# Patient Record
Sex: MALE | Race: WHITE | NOT HISPANIC OR LATINO | Employment: FULL TIME | URBAN - METROPOLITAN AREA
[De-identification: names, ages, dates, MRNs, and addresses within clinical notes are randomized per-mention and may not be internally consistent; named-entity substitution may affect disease eponyms.]

---

## 2019-07-24 ENCOUNTER — HOSPITAL ENCOUNTER (EMERGENCY)
Facility: HOSPITAL | Age: 64
Discharge: HOME | End: 2019-07-24
Attending: EMERGENCY MEDICINE
Payer: COMMERCIAL

## 2019-07-24 VITALS
TEMPERATURE: 99 F | BODY MASS INDEX: 23.05 KG/M2 | DIASTOLIC BLOOD PRESSURE: 79 MMHG | HEART RATE: 65 BPM | OXYGEN SATURATION: 96 % | SYSTOLIC BLOOD PRESSURE: 126 MMHG | RESPIRATION RATE: 16 BRPM | HEIGHT: 64 IN | WEIGHT: 135 LBS

## 2019-07-24 DIAGNOSIS — S41.119A LACERATION OF ARM: ICD-10-CM

## 2019-07-24 PROCEDURE — 12002 RPR S/N/AX/GEN/TRNK2.6-7.5CM: CPT

## 2019-07-24 PROCEDURE — 90471 IMMUNIZATION ADMIN: CPT | Performed by: NURSE PRACTITIONER

## 2019-07-24 PROCEDURE — 25000003 PHARM REV CODE 250: Performed by: NURSE PRACTITIONER

## 2019-07-24 PROCEDURE — 90715 TDAP VACCINE 7 YRS/> IM: CPT | Performed by: NURSE PRACTITIONER

## 2019-07-24 PROCEDURE — 99284 EMERGENCY DEPT VISIT MOD MDM: CPT | Mod: 25

## 2019-07-24 PROCEDURE — 63600175 PHARM REV CODE 636 W HCPCS: Performed by: NURSE PRACTITIONER

## 2019-07-24 RX ORDER — HYDROCODONE BITARTRATE AND ACETAMINOPHEN 5; 325 MG/1; MG/1
1 TABLET ORAL
Status: COMPLETED | OUTPATIENT
Start: 2019-07-24 | End: 2019-07-24

## 2019-07-24 RX ORDER — LIDOCAINE HYDROCHLORIDE 10 MG/ML
10 INJECTION INFILTRATION; PERINEURAL
Status: COMPLETED | OUTPATIENT
Start: 2019-07-24 | End: 2019-07-24

## 2019-07-24 RX ORDER — MUPIROCIN 20 MG/G
OINTMENT TOPICAL 3 TIMES DAILY
Qty: 15 G | Refills: 0 | Status: SHIPPED | OUTPATIENT
Start: 2019-07-24 | End: 2019-07-31

## 2019-07-24 RX ORDER — MUPIROCIN 20 MG/G
1 OINTMENT TOPICAL
Status: COMPLETED | OUTPATIENT
Start: 2019-07-24 | End: 2019-07-24

## 2019-07-24 RX ORDER — CEPHALEXIN 500 MG/1
500 CAPSULE ORAL 2 TIMES DAILY
Qty: 10 CAPSULE | Refills: 0 | Status: SHIPPED | OUTPATIENT
Start: 2019-07-24 | End: 2019-07-29

## 2019-07-24 RX ADMIN — CLOSTRIDIUM TETANI TOXOID ANTIGEN (FORMALDEHYDE INACTIVATED), CORYNEBACTERIUM DIPHTHERIAE TOXOID ANTIGEN (FORMALDEHYDE INACTIVATED), BORDETELLA PERTUSSIS TOXOID ANTIGEN (GLUTARALDEHYDE INACTIVATED), BORDETELLA PERTUSSIS FILAMENTOUS HEMAGGLUTININ ANTIGEN (FORMALDEHYDE INACTIVATED), BORDETELLA PERTUSSIS PERTACTIN ANTIGEN, AND BORDETELLA PERTUSSIS FIMBRIAE 2/3 ANTIGEN 0.5 ML: 5; 2; 2.5; 5; 3; 5 INJECTION, SUSPENSION INTRAMUSCULAR at 12:07

## 2019-07-24 RX ADMIN — LIDOCAINE HYDROCHLORIDE 10 ML: 10 INJECTION, SOLUTION INFILTRATION; PERINEURAL at 12:07

## 2019-07-24 RX ADMIN — HYDROCODONE BITARTRATE AND ACETAMINOPHEN 1 TABLET: 5; 325 TABLET ORAL at 01:07

## 2019-07-24 RX ADMIN — MUPIROCIN 22 G: 20 OINTMENT TOPICAL at 02:07

## 2019-07-24 NOTE — DISCHARGE INSTRUCTIONS
Please keep your wound clean and dry.  Wash gently with soap and water and apply antibiotic ointment (bacitracin, neosporin, etc.) over the wound after washing. Please watch for signs of infection including: increased\spreading redness, swelling, pus-like discharge, or a fever greater than 100.4F. If you experience any of these, please contact your Primary Care Doctor or Return to the Emergency Department for a wound check.     Please follow up with your Primary Care Doctor in 10 days for wound recheck and suture removal.  You may return to the Emergency Department if you are unable to see your Primary Care Doctor.  Please return to the ER for any new or worsening symptoms.

## 2019-07-24 NOTE — ED PROVIDER NOTES
Encounter Date: 7/24/2019       History     Chief Complaint   Patient presents with    Laceration     pt reports cutting left arm on gardening indio just pta, bleeding controlled at this time. tetanus not up to date     CC: laceration    HPI: 63-year-old male with no significant past medical history presents to the ED today complaining of a laceration to the anteromedial aspect of the left forearm which occurred approximately 1.5 hrs ago while he was pulling weeds. He states that he reached for a weed, the garden sheers were open and sliced his forearm when he pulled back on the weed. He describes the pain as burning with no alleviating or aggravating factors and no radiation. The pain is rated as a 3/10 at this time. He has a 5-pack-year smoking history and consumes about 3 cans of beer each day with his last consumption being 1.5 hrs ago. He denies joint pain, LOC, or numbness.        Review of patient's allergies indicates:  No Known Allergies  History reviewed. No pertinent past medical history.  History reviewed. No pertinent surgical history.  History reviewed. No pertinent family history.  Social History     Tobacco Use    Smoking status: Never Smoker   Substance Use Topics    Alcohol use: Yes     Comment: occasional    Drug use: Never     Review of Systems   Constitutional: Negative for chills and fever.   HENT: Negative for sore throat.    Respiratory: Negative for shortness of breath.    Cardiovascular: Negative for chest pain.   Gastrointestinal: Negative for abdominal pain, diarrhea, nausea and vomiting.   Genitourinary: Negative for dysuria.   Musculoskeletal: Negative for back pain.   Skin: Positive for wound. Negative for rash.   Neurological: Negative for weakness.   Hematological: Does not bruise/bleed easily.       Physical Exam     Initial Vitals [07/24/19 1217]   BP Pulse Resp Temp SpO2   (!) 131/95 87 16 98.1 °F (36.7 °C) 98 %      MAP       --         Physical Exam    Vitals  reviewed.  Constitutional: He appears well-developed and well-nourished. He is not diaphoretic.  Non-toxic appearance. He does not have a sickly appearance. He does not appear ill. No distress.   HENT:   Head: Normocephalic and atraumatic.   Right Ear: External ear normal.   Left Ear: External ear normal.   Neck: Normal range of motion.   Cardiovascular: Normal rate, regular rhythm, normal heart sounds and intact distal pulses.   Pulmonary/Chest: Breath sounds normal. No respiratory distress.   Musculoskeletal: Normal range of motion.        Left wrist: He exhibits laceration.        Arms:  Neurological: He is alert and oriented to person, place, and time. GCS eye subscore is 4. GCS verbal subscore is 5. GCS motor subscore is 6.   Skin: Skin is warm, dry and intact. No rash noted. No erythema.   Psychiatric: He has a normal mood and affect. Thought content normal.         ED Course   Lac Repair  Date/Time: 7/24/2019 2:07 PM  Performed by: Felisha Cazares NP  Authorized by: Garth Spear MD   Body area: upper extremity  Location details: left lower arm  Laceration length: 3 cm  Foreign bodies: no foreign bodies  Tendon involvement: none  Nerve involvement: none  Vascular damage: no  Anesthesia: local infiltration    Anesthesia:  Local Anesthetic: lidocaine 1% without epinephrine  Anesthetic total: 5 mL  Irrigation solution: saline  Irrigation method: jet lavage  Amount of cleaning: extensive  Debridement: none  Degree of undermining: none  Skin closure: 4-0 nylon  Number of sutures: 7  Technique: simple  Approximation: close  Approximation difficulty: simple  Dressing: antibiotic ointment  Patient tolerance: Patient tolerated the procedure well with no immediate complications        Labs Reviewed - No data to display       Imaging Results          X-Ray Forearm Left (Final result)  Result time 07/24/19 13:11:27    Final result by Lamont Zepeda Jr., MD (07/24/19 13:11:27)                 Impression:      Soft  tissue injury and scattered air in the forearm.  No opaque foreign body or fracture seen.  Dressing noted      Electronically signed by: Lamont Zepeda MD  Date:    07/24/2019  Time:    13:11             Narrative:    EXAMINATION:  XR FOREARM LEFT    CLINICAL HISTORY:  Laceration without foreign body of unspecified upper arm, initial encounter    TECHNIQUE:  AP and lateral views of the left forearm were performed.    COMPARISON:  None    FINDINGS:  There is scattered air in the soft tissues about the left forearm and wrist.  Bandage about the mid forearm noted as well.  No opaque foreign body seen..                                 Medical Decision Making:   ED Management:  This is an evaluation of a 63 y.o. male that presents to the Emergency Department for a Laceration. Physical Exam shows a non-toxic, afebrile, and well appearing male. There is a laceration to left forearm. There is no surrounding erythema or area of increased warmth. Arm compartments are soft. There is full ROM of hand and fingers against resistance. Equal sensation to the extremity. No visible tendon lacerations observed on exam.  The wound was irrigated and visually inspected prior to closure. No visible foreign bodies noted. Vital Signs Are Reassuring. Tetanus is not up to date.     RESULTS:  X-ray with no foreign body, fracture, dislocation.  Soft tissue injury with associated scatter air noted. The wound was closed per the procedure note.     My overall impression is Laceration. I considered, but at this time, do not suspect cellulitis, compartment syndrome, underlying fracture, tendon injury, or suspect any retained foreign body at this time.     D/C Information: Laceration/Wound Care/Suture Removal instructions given. The diagnosis, treatment plan, instructions for follow-up and reevaluation with his PCP or Return to ED in 10 days for suture removal as well as ED return precautions were discussed and understanding was verbalized.                        Clinical Impression:       ICD-10-CM ICD-9-CM   1. Laceration of arm S41.119A 884.0         Disposition:   Disposition: Discharged  Condition: Stable                        Felisha Cazares NP  07/24/19 3607

## 2019-07-24 NOTE — ED TRIAGE NOTES
Pt reports cutting himself with a brand new pair of gardening indio today when he was doing lawn work.  Pt presents with laceration to posterior forearm with bleeding controlled with pressure dressing.  Denies loc or other injury.  Denies f/c/n/v/d.

## 2019-07-24 NOTE — MEDICAL/APP STUDENT
"  History     Chief Complaint   Patient presents with    Laceration     pt reports cutting left arm on gardening indio just pta, bleeding controlled at this time. tetanus not up to date     63-year-old male with no significant past medical history presents to the ED today complaining of a laceration to the anteromedial aspect of the left forearm which occurred approximately 1.5 hrs ago while he was pulling weeds. He states that he reached for a weed, the garden sheers were open and sliced his forearm when he pulled back on the weed. He describes the pain as burning with no alleviating or aggravating factors and no radiation. The pain is rated as a 3/10 at this time. He has a 5-pack-year smoking history and consumes about 3 cans of beer each day with his last consumption being 1.5 hrs ago. He denies joint pain, LOC, or numbness.    The history is provided by the patient.       History reviewed. No pertinent past medical history.    History reviewed. No pertinent surgical history.    History reviewed. No pertinent family history.    Social History     Tobacco Use    Smoking status: Never Smoker   Substance Use Topics    Alcohol use: Yes     Comment: occasional    Drug use: Never       Review of Systems   Constitutional: Negative for chills and fever.   HENT: Negative for rhinorrhea and sore throat.    Eyes: Negative for visual disturbance.   Respiratory: Negative for cough and shortness of breath.    Cardiovascular: Negative for chest pain and palpitations.   Gastrointestinal: Negative for abdominal pain, constipation, diarrhea, nausea and vomiting.   Genitourinary: Negative for hematuria and urgency.   Musculoskeletal: Negative for arthralgias.   Skin: Positive for wound.   Neurological: Negative for syncope and numbness.       Physical Exam   BP (!) 131/95 (Patient Position: Sitting)   Pulse 87   Temp 98.1 °F (36.7 °C) (Oral)   Resp 16   Ht 5' 4" (1.626 m)   Wt 61.2 kg (135 lb)   SpO2 98%   BMI 23.17 " kg/m²     Physical Exam    Constitutional: He appears well-developed.   HENT:   Mouth/Throat: Oropharynx is clear and moist and mucous membranes are normal.   Cardiovascular: Exam reveals no gallop and no friction rub.    No murmur heard.  Pulmonary/Chest: He has no wheezes. He has no rhonchi. He has no rales.   Abdominal: Soft. Bowel sounds are normal. He exhibits no distension. There is no tenderness.   Musculoskeletal:        Left forearm: He exhibits laceration.        Arms:  Anterolateral forearm laceration noted.   Neurological: He is alert. No cranial nerve deficit or sensory deficit.         ED Course

## 2023-03-22 ENCOUNTER — LAB VISIT (OUTPATIENT)
Dept: LAB | Facility: HOSPITAL | Age: 68
End: 2023-03-22
Attending: FAMILY MEDICINE
Payer: MEDICARE

## 2023-03-22 ENCOUNTER — OFFICE VISIT (OUTPATIENT)
Dept: FAMILY MEDICINE | Facility: CLINIC | Age: 68
End: 2023-03-22
Payer: MEDICARE

## 2023-03-22 VITALS
OXYGEN SATURATION: 99 % | HEART RATE: 70 BPM | SYSTOLIC BLOOD PRESSURE: 134 MMHG | WEIGHT: 133.63 LBS | TEMPERATURE: 99 F | DIASTOLIC BLOOD PRESSURE: 82 MMHG | HEIGHT: 64 IN | BODY MASS INDEX: 22.81 KG/M2

## 2023-03-22 DIAGNOSIS — Z13.6 ENCOUNTER FOR LIPID SCREENING FOR CARDIOVASCULAR DISEASE: ICD-10-CM

## 2023-03-22 DIAGNOSIS — F17.210 NICOTINE DEPENDENCE, CIGARETTES, UNCOMPLICATED: ICD-10-CM

## 2023-03-22 DIAGNOSIS — Z12.2 SCREENING FOR LUNG CANCER: ICD-10-CM

## 2023-03-22 DIAGNOSIS — Z00.00 GENERAL MEDICAL EXAM: Primary | ICD-10-CM

## 2023-03-22 DIAGNOSIS — Z13.220 ENCOUNTER FOR LIPID SCREENING FOR CARDIOVASCULAR DISEASE: ICD-10-CM

## 2023-03-22 DIAGNOSIS — Z00.00 GENERAL MEDICAL EXAM: ICD-10-CM

## 2023-03-22 DIAGNOSIS — Z13.6 SCREENING FOR AAA (ABDOMINAL AORTIC ANEURYSM): ICD-10-CM

## 2023-03-22 DIAGNOSIS — Z11.59 NEED FOR HEPATITIS C SCREENING TEST: ICD-10-CM

## 2023-03-22 LAB
ALBUMIN SERPL BCP-MCNC: 4.2 G/DL (ref 3.5–5.2)
ALP SERPL-CCNC: 50 U/L (ref 55–135)
ALT SERPL W/O P-5'-P-CCNC: 49 U/L (ref 10–44)
ANION GAP SERPL CALC-SCNC: 9 MMOL/L (ref 8–16)
AST SERPL-CCNC: 39 U/L (ref 10–40)
BASOPHILS # BLD AUTO: 0.1 K/UL (ref 0–0.2)
BASOPHILS NFR BLD: 1.3 % (ref 0–1.9)
BILIRUB SERPL-MCNC: 0.6 MG/DL (ref 0.1–1)
BUN SERPL-MCNC: 9 MG/DL (ref 8–23)
CALCIUM SERPL-MCNC: 9.6 MG/DL (ref 8.7–10.5)
CHLORIDE SERPL-SCNC: 99 MMOL/L (ref 95–110)
CHOLEST SERPL-MCNC: 221 MG/DL (ref 120–199)
CHOLEST/HDLC SERPL: 2.8 {RATIO} (ref 2–5)
CO2 SERPL-SCNC: 25 MMOL/L (ref 23–29)
CREAT SERPL-MCNC: 0.8 MG/DL (ref 0.5–1.4)
DIFFERENTIAL METHOD: ABNORMAL
EOSINOPHIL # BLD AUTO: 0.5 K/UL (ref 0–0.5)
EOSINOPHIL NFR BLD: 6.7 % (ref 0–8)
ERYTHROCYTE [DISTWIDTH] IN BLOOD BY AUTOMATED COUNT: 13.4 % (ref 11.5–14.5)
EST. GFR  (NO RACE VARIABLE): >60 ML/MIN/1.73 M^2
GLUCOSE SERPL-MCNC: 83 MG/DL (ref 70–110)
HCT VFR BLD AUTO: 39.2 % (ref 40–54)
HDLC SERPL-MCNC: 79 MG/DL (ref 40–75)
HDLC SERPL: 35.7 % (ref 20–50)
HGB BLD-MCNC: 13.6 G/DL (ref 14–18)
IMM GRANULOCYTES # BLD AUTO: 0.04 K/UL (ref 0–0.04)
IMM GRANULOCYTES NFR BLD AUTO: 0.5 % (ref 0–0.5)
LDLC SERPL CALC-MCNC: 128 MG/DL (ref 63–159)
LYMPHOCYTES # BLD AUTO: 2.9 K/UL (ref 1–4.8)
LYMPHOCYTES NFR BLD: 38.7 % (ref 18–48)
MCH RBC QN AUTO: 32.8 PG (ref 27–31)
MCHC RBC AUTO-ENTMCNC: 34.7 G/DL (ref 32–36)
MCV RBC AUTO: 95 FL (ref 82–98)
MONOCYTES # BLD AUTO: 1 K/UL (ref 0.3–1)
MONOCYTES NFR BLD: 12.7 % (ref 4–15)
NEUTROPHILS # BLD AUTO: 3 K/UL (ref 1.8–7.7)
NEUTROPHILS NFR BLD: 40.1 % (ref 38–73)
NONHDLC SERPL-MCNC: 142 MG/DL
NRBC BLD-RTO: 0 /100 WBC
PLATELET # BLD AUTO: 301 K/UL (ref 150–450)
PMV BLD AUTO: 10 FL (ref 9.2–12.9)
POTASSIUM SERPL-SCNC: 4.9 MMOL/L (ref 3.5–5.1)
PROT SERPL-MCNC: 7.2 G/DL (ref 6–8.4)
RBC # BLD AUTO: 4.15 M/UL (ref 4.6–6.2)
SODIUM SERPL-SCNC: 133 MMOL/L (ref 136–145)
TRIGL SERPL-MCNC: 70 MG/DL (ref 30–150)
WBC # BLD AUTO: 7.58 K/UL (ref 3.9–12.7)

## 2023-03-22 PROCEDURE — 3079F DIAST BP 80-89 MM HG: CPT | Mod: CPTII,S$GLB,, | Performed by: FAMILY MEDICINE

## 2023-03-22 PROCEDURE — 1101F PR PT FALLS ASSESS DOC 0-1 FALLS W/OUT INJ PAST YR: ICD-10-PCS | Mod: CPTII,S$GLB,, | Performed by: FAMILY MEDICINE

## 2023-03-22 PROCEDURE — 1159F PR MEDICATION LIST DOCUMENTED IN MEDICAL RECORD: ICD-10-PCS | Mod: CPTII,S$GLB,, | Performed by: FAMILY MEDICINE

## 2023-03-22 PROCEDURE — 99387 PR PREVENTIVE VISIT,NEW,65 & OVER: ICD-10-PCS | Mod: S$GLB,,, | Performed by: FAMILY MEDICINE

## 2023-03-22 PROCEDURE — 3288F PR FALLS RISK ASSESSMENT DOCUMENTED: ICD-10-PCS | Mod: CPTII,S$GLB,, | Performed by: FAMILY MEDICINE

## 2023-03-22 PROCEDURE — 1159F MED LIST DOCD IN RCRD: CPT | Mod: CPTII,S$GLB,, | Performed by: FAMILY MEDICINE

## 2023-03-22 PROCEDURE — 80061 LIPID PANEL: CPT | Performed by: FAMILY MEDICINE

## 2023-03-22 PROCEDURE — 99999 PR PBB SHADOW E&M-NEW PATIENT-LVL IV: CPT | Mod: PBBFAC,,, | Performed by: FAMILY MEDICINE

## 2023-03-22 PROCEDURE — 3075F SYST BP GE 130 - 139MM HG: CPT | Mod: CPTII,S$GLB,, | Performed by: FAMILY MEDICINE

## 2023-03-22 PROCEDURE — 1126F AMNT PAIN NOTED NONE PRSNT: CPT | Mod: CPTII,S$GLB,, | Performed by: FAMILY MEDICINE

## 2023-03-22 PROCEDURE — 80053 COMPREHEN METABOLIC PANEL: CPT | Performed by: FAMILY MEDICINE

## 2023-03-22 PROCEDURE — 1160F RVW MEDS BY RX/DR IN RCRD: CPT | Mod: CPTII,S$GLB,, | Performed by: FAMILY MEDICINE

## 2023-03-22 PROCEDURE — 3008F PR BODY MASS INDEX (BMI) DOCUMENTED: ICD-10-PCS | Mod: CPTII,S$GLB,, | Performed by: FAMILY MEDICINE

## 2023-03-22 PROCEDURE — 1160F PR REVIEW ALL MEDS BY PRESCRIBER/CLIN PHARMACIST DOCUMENTED: ICD-10-PCS | Mod: CPTII,S$GLB,, | Performed by: FAMILY MEDICINE

## 2023-03-22 PROCEDURE — 3008F BODY MASS INDEX DOCD: CPT | Mod: CPTII,S$GLB,, | Performed by: FAMILY MEDICINE

## 2023-03-22 PROCEDURE — 3075F PR MOST RECENT SYSTOLIC BLOOD PRESS GE 130-139MM HG: ICD-10-PCS | Mod: CPTII,S$GLB,, | Performed by: FAMILY MEDICINE

## 2023-03-22 PROCEDURE — 86803 HEPATITIS C AB TEST: CPT | Performed by: FAMILY MEDICINE

## 2023-03-22 PROCEDURE — 1101F PT FALLS ASSESS-DOCD LE1/YR: CPT | Mod: CPTII,S$GLB,, | Performed by: FAMILY MEDICINE

## 2023-03-22 PROCEDURE — 3288F FALL RISK ASSESSMENT DOCD: CPT | Mod: CPTII,S$GLB,, | Performed by: FAMILY MEDICINE

## 2023-03-22 PROCEDURE — 99387 INIT PM E/M NEW PAT 65+ YRS: CPT | Mod: S$GLB,,, | Performed by: FAMILY MEDICINE

## 2023-03-22 PROCEDURE — 85025 COMPLETE CBC W/AUTO DIFF WBC: CPT | Performed by: FAMILY MEDICINE

## 2023-03-22 PROCEDURE — 99999 PR PBB SHADOW E&M-NEW PATIENT-LVL IV: ICD-10-PCS | Mod: PBBFAC,,, | Performed by: FAMILY MEDICINE

## 2023-03-22 PROCEDURE — 3079F PR MOST RECENT DIASTOLIC BLOOD PRESSURE 80-89 MM HG: ICD-10-PCS | Mod: CPTII,S$GLB,, | Performed by: FAMILY MEDICINE

## 2023-03-22 PROCEDURE — 1126F PR PAIN SEVERITY QUANTIFIED, NO PAIN PRESENT: ICD-10-PCS | Mod: CPTII,S$GLB,, | Performed by: FAMILY MEDICINE

## 2023-03-22 PROCEDURE — 36415 COLL VENOUS BLD VENIPUNCTURE: CPT | Mod: PN | Performed by: FAMILY MEDICINE

## 2023-03-22 NOTE — PROGRESS NOTES
"  Patient Name: Pradip Padilla    : 1955  MRN: [id number]      Subjective:     Patient ID: Pradip is a 67 y.o. male    Chief Complaint:  Annual Exam    67-year-old male comes in to establish care with new PCP.  He does not take any medications.  He is a long-time smoker.  Currently smokes half a pack per day.  States he used to smoke more in the past.  Has been smoking for over 30 years. He reports he that colonoscopy with Mary ESTEVEZ, Dr. Hu, last year.  He reports that this showed polyps.      Review of Systems   Constitutional:  Negative for unexpected weight change.   HENT:  Negative for ear pain and sore throat.    Eyes:  Negative for visual disturbance.   Respiratory:  Negative for shortness of breath.    Cardiovascular:  Negative for chest pain.   Gastrointestinal:  Negative for abdominal pain and blood in stool.   Endocrine: Negative for cold intolerance and heat intolerance.   Genitourinary:  Negative for dysuria and frequency.   Integumentary:  Negative for rash.   Neurological:  Negative for weakness, numbness and headaches.   Hematological:  Negative for adenopathy.   Psychiatric/Behavioral:  Negative for suicidal ideas.       Objective:   /82 (BP Location: Right arm, Patient Position: Sitting, BP Method: Medium (Manual))   Pulse 70   Temp 98.6 °F (37 °C) (Oral)   Ht 5' 4" (1.626 m)   Wt 60.6 kg (133 lb 9.6 oz)   SpO2 99%   BMI 22.93 kg/m²     Physical Exam  Vitals reviewed.   Constitutional:       General: He is not in acute distress.  HENT:      Head: Normocephalic and atraumatic.      Right Ear: Ear canal and external ear normal.      Left Ear: Ear canal and external ear normal.      Nose: Nose normal.      Mouth/Throat:      Mouth: Mucous membranes are moist.      Pharynx: No oropharyngeal exudate or posterior oropharyngeal erythema.   Eyes:      Extraocular Movements: Extraocular movements intact.      Conjunctiva/sclera: Conjunctivae normal.      Pupils: Pupils are equal, " round, and reactive to light.   Cardiovascular:      Rate and Rhythm: Normal rate and regular rhythm.      Pulses: Normal pulses.      Heart sounds: Normal heart sounds.   Pulmonary:      Effort: Pulmonary effort is normal. No respiratory distress.      Breath sounds: No wheezing or rales.   Abdominal:      General: Abdomen is flat. Bowel sounds are normal. There is no distension.      Palpations: Abdomen is soft.      Tenderness: There is no abdominal tenderness. There is no guarding.   Musculoskeletal:      Cervical back: Normal range of motion. No rigidity or tenderness.   Lymphadenopathy:      Cervical: No cervical adenopathy.   Skin:     General: Skin is warm.      Capillary Refill: Capillary refill takes less than 2 seconds.   Neurological:      Mental Status: He is alert and oriented to person, place, and time.      Cranial Nerves: No cranial nerve deficit.      Sensory: No sensory deficit.   Psychiatric:         Mood and Affect: Mood normal.         Behavior: Behavior normal.      Assessment        ICD-10-CM ICD-9-CM   1. General medical exam  Z00.00 V70.9   2. Encounter for lipid screening for cardiovascular disease  Z13.220 V77.91    Z13.6 V81.2   3. Need for hepatitis C screening test  Z11.59 V73.89   4. Nicotine dependence, cigarettes, uncomplicated  F17.210 305.1   5. Screening for lung cancer  Z12.2 V76.0   6. Screening for AAA (abdominal aortic aneurysm)  Z13.6 V81.2         Plan:     1. General medical exam  -     Comprehensive Metabolic Panel; Future; Expected date: 03/22/2023  -     Lipid Panel; Future; Expected date: 03/22/2023  -     Hepatitis C Antibody; Future; Expected date: 03/22/2023    2. Encounter for lipid screening for cardiovascular disease  -     Lipid Panel; Future; Expected date: 03/22/2023    3. Need for hepatitis C screening test  -     Hepatitis C Antibody; Future; Expected date: 03/22/2023    4. Nicotine dependence, cigarettes, uncomplicated  -     US Abdominal Aorta; Future;  Expected date: 03/22/2023  -     CT Chest Lung Screening Low Dose; Future; Expected date: 03/22/2023  -     CBC Auto Differential; Future; Expected date: 03/22/2023    5. Screening for lung cancer  -     CT Chest Lung Screening Low Dose; Future; Expected date: 03/22/2023    6. Screening for AAA (abdominal aortic aneurysm)  -     US Abdominal Aorta; Future; Expected date: 03/22/2023       Age appropriate recommendations reviewed.  Screening labs ordered.  Will attempt to get colonoscopy  Screening lipid panel ordered.  Screen for HCV as per USPSTF guidelines  Given smoking history, will get low-dose CT scan of chest, as well as screen for abdominal aortic aneurysm        -Dereck Mac Jr., MD, AAHIVS      This office note has been dictated.  This dictation has been generated using M-Modal Fluency Direct dictation; some phonetic errors may occur.         There are no Patient Instructions on file for this visit.      Future Appointments   Date Time Provider Department Center   4/10/2023  8:20 AM [facility] CT2 LIMIT 500 LBS [facility] CT SCAN [facility] [place]   4/10/2023  8:45 AM [facility] US ROOM 2 [facility] ULSOUND [facility] [place]

## 2023-03-23 LAB — HCV AB SERPL QL IA: NORMAL

## 2023-03-26 ENCOUNTER — PATIENT MESSAGE (OUTPATIENT)
Dept: ADMINISTRATIVE | Facility: HOSPITAL | Age: 68
End: 2023-03-26
Payer: MEDICARE

## 2023-03-26 PROBLEM — F17.210 NICOTINE DEPENDENCE, CIGARETTES, UNCOMPLICATED: Status: ACTIVE | Noted: 2023-03-26

## 2023-04-10 ENCOUNTER — HOSPITAL ENCOUNTER (OUTPATIENT)
Dept: RADIOLOGY | Facility: HOSPITAL | Age: 68
Discharge: HOME | End: 2023-04-10
Attending: FAMILY MEDICINE
Payer: MEDICARE

## 2023-04-10 DIAGNOSIS — Z13.6 SCREENING FOR AAA (ABDOMINAL AORTIC ANEURYSM): ICD-10-CM

## 2023-04-10 DIAGNOSIS — Z12.2 SCREENING FOR LUNG CANCER: ICD-10-CM

## 2023-04-10 DIAGNOSIS — F17.210 NICOTINE DEPENDENCE, CIGARETTES, UNCOMPLICATED: ICD-10-CM

## 2023-04-10 PROCEDURE — 71271 CT THORAX LUNG CANCER SCR C-: CPT | Mod: 26,,, | Performed by: RADIOLOGY

## 2023-04-10 PROCEDURE — 76775 US EXAM ABDO BACK WALL LIM: CPT | Mod: 26,,, | Performed by: RADIOLOGY

## 2023-04-10 PROCEDURE — 71271 CT CHEST LUNG SCREENING LOW DOSE: ICD-10-PCS | Mod: 26,,, | Performed by: RADIOLOGY

## 2023-04-10 PROCEDURE — 71271 CT THORAX LUNG CANCER SCR C-: CPT | Mod: TC

## 2023-04-10 PROCEDURE — 76775 US EXAM ABDO BACK WALL LIM: CPT | Mod: TC

## 2023-04-10 PROCEDURE — 76775 US ABDOMINAL AORTA: ICD-10-PCS | Mod: 26,,, | Performed by: RADIOLOGY

## 2023-04-13 ENCOUNTER — PATIENT MESSAGE (OUTPATIENT)
Dept: FAMILY MEDICINE | Facility: CLINIC | Age: 68
End: 2023-04-13

## 2023-04-13 ENCOUNTER — TELEPHONE (OUTPATIENT)
Dept: FAMILY MEDICINE | Facility: CLINIC | Age: 68
End: 2023-04-13
Payer: MEDICARE

## 2023-04-13 ENCOUNTER — OFFICE VISIT (OUTPATIENT)
Dept: FAMILY MEDICINE | Facility: CLINIC | Age: 68
End: 2023-04-13
Payer: MEDICARE

## 2023-04-13 DIAGNOSIS — R91.1 LUNG NODULE: Chronic | ICD-10-CM

## 2023-04-13 DIAGNOSIS — Z12.5 SCREENING FOR PROSTATE CANCER: ICD-10-CM

## 2023-04-13 DIAGNOSIS — I70.0 AORTIC ATHEROSCLEROSIS: Chronic | ICD-10-CM

## 2023-04-13 DIAGNOSIS — I10 BENIGN ESSENTIAL HYPERTENSION: Chronic | ICD-10-CM

## 2023-04-13 DIAGNOSIS — E78.5 DYSLIPIDEMIA: Primary | Chronic | ICD-10-CM

## 2023-04-13 DIAGNOSIS — D64.9 NORMOCYTIC ANEMIA: ICD-10-CM

## 2023-04-13 PROCEDURE — 1159F MED LIST DOCD IN RCRD: CPT | Mod: CPTII,95,, | Performed by: FAMILY MEDICINE

## 2023-04-13 PROCEDURE — 99214 PR OFFICE/OUTPT VISIT, EST, LEVL IV, 30-39 MIN: ICD-10-PCS | Mod: 95,,, | Performed by: FAMILY MEDICINE

## 2023-04-13 PROCEDURE — 1159F PR MEDICATION LIST DOCUMENTED IN MEDICAL RECORD: ICD-10-PCS | Mod: CPTII,95,, | Performed by: FAMILY MEDICINE

## 2023-04-13 PROCEDURE — 99214 OFFICE O/P EST MOD 30 MIN: CPT | Mod: 95,,, | Performed by: FAMILY MEDICINE

## 2023-04-13 PROCEDURE — 1160F RVW MEDS BY RX/DR IN RCRD: CPT | Mod: CPTII,95,, | Performed by: FAMILY MEDICINE

## 2023-04-13 PROCEDURE — 1160F PR REVIEW ALL MEDS BY PRESCRIBER/CLIN PHARMACIST DOCUMENTED: ICD-10-PCS | Mod: CPTII,95,, | Performed by: FAMILY MEDICINE

## 2023-04-13 NOTE — ASSESSMENT & PLAN NOTE
Discussed with patient finding of anemia.  He denies any blood or urine and stools.  He denies any signs of bleeding.  He agrees to have lab testing done to further evaluate this.

## 2023-04-13 NOTE — ASSESSMENT & PLAN NOTE
The 10-year ASCVD risk score (Keena MADRID, et al., 2019) is: 18.3%    Values used to calculate the score:      Age: 67 years      Sex: Male      Is Non- : No      Diabetic: No      Tobacco smoker: Yes      Systolic Blood Pressure: 134 mmHg      Is BP treated: No      HDL Cholesterol: 79 mg/dL      Total Cholesterol: 221 mg/dL    Elevated risk score for cardiovascular event was explained to patient.    Recommended starting statin medication to lower his risk.    Patient states that he would prefer to not start medication and will attempt to make dietary changes first.  He agrees to recheck levels in four months and recheck blood pressure in four months and follow up in the office.    I sent to the patient on the patient portal dietary recommendations to help.

## 2023-04-13 NOTE — TELEPHONE ENCOUNTER
Patient has been scheduled for virtual visit and notified to answer all pre-check questions before visit.

## 2023-04-13 NOTE — PROGRESS NOTES
Patient Name: Pradip Padilla    : 1955  MRN: [id number]    The patient location is: [place], [place]  The chief complaint leading to consultation is: abnormal lab results    Visit type: audiovisual    Face to Face time with patient: 10 minutes  15 minutes of total time spent on the encounter, which includes face to face time and non-face to face time preparing to see the patient (eg, review of tests), Obtaining and/or reviewing separately obtained history, Documenting clinical information in the electronic or other health record, Independently interpreting results (not separately reported) and communicating results to the patient/family/caregiver, or Care coordination (not separately reported).         Each patient to whom he or she provides medical services by telemedicine is:  (1) informed of the relationship between the physician and patient and the respective role of any other health care provider with respect to management of the patient; and (2) notified that he or she may decline to receive medical services by telemedicine and may withdraw from such care at any time.    Notes:     Subjective:     Patient ID: Pradip is a 67 y.o. male    Chief Complaint:  abnormal labs    67-year-old male makes virtual visit to discuss abnormal lab results.  His labs had shown anemia and high cholesterol.  He states that he is never been a cholesterol medication nor blood pressure medication.  States that he prefers not to be on medication.  He states that he would prefer to make dietary changes before starting any medication.       Review of Systems   Constitutional:  Negative for chills.   Respiratory:  Negative for cough and shortness of breath.    Cardiovascular:  Negative for chest pain and palpitations.   Gastrointestinal:  Negative for abdominal pain, anal bleeding, constipation, nausea and vomiting.   Genitourinary:  Positive for dysuria. Negative for flank pain, frequency, hematuria and urgency.    Integumentary:  Negative for rash.      Objective:   There were no vitals taken for this visit.    Physical Exam  HENT:      Head: Normocephalic.   Pulmonary:      Effort: Pulmonary effort is normal.   Neurological:      Mental Status: He is alert.   Psychiatric:         Mood and Affect: Mood normal.         Behavior: Behavior normal.         Thought Content: Thought content normal.        Lab Visit on 03/22/2023   Component Date Value Ref Range Status    Sodium 03/22/2023 133 (L)  136 - 145 mmol/L Final    Potassium 03/22/2023 4.9  3.5 - 5.1 mmol/L Final    Chloride 03/22/2023 99  95 - 110 mmol/L Final    CO2 03/22/2023 25  23 - 29 mmol/L Final    Glucose 03/22/2023 83  70 - 110 mg/dL Final    BUN 03/22/2023 9  8 - 23 mg/dL Final    Creatinine 03/22/2023 0.8  0.5 - 1.4 mg/dL Final    Calcium 03/22/2023 9.6  8.7 - 10.5 mg/dL Final    Total Protein 03/22/2023 7.2  6.0 - 8.4 g/dL Final    Albumin 03/22/2023 4.2  3.5 - 5.2 g/dL Final    Total Bilirubin 03/22/2023 0.6  0.1 - 1.0 mg/dL Final    Comment: For infants and newborns, interpretation of results should be based  on gestational age, weight and in agreement with clinical  observations.    Premature Infant recommended reference ranges:  Up to 24 hours.............<8.0 mg/dL  Up to 48 hours............<12.0 mg/dL  3-5 days..................<15.0 mg/dL  6-29 days.................<15.0 mg/dL      Alkaline Phosphatase 03/22/2023 50 (L)  55 - 135 U/L Final    AST 03/22/2023 39  10 - 40 U/L Final    ALT 03/22/2023 49 (H)  10 - 44 U/L Final    Anion Gap 03/22/2023 9  8 - 16 mmol/L Final    eGFR 03/22/2023 >60  >60 mL/min/1.73 m^2 Final    Cholesterol 03/22/2023 221 (H)  120 - 199 mg/dL Final    Comment: The National Cholesterol Education Program (NCEP) has set the  following guidelines (reference ranges) for Cholesterol:  Optimal.....................<200 mg/dL  Borderline High.............200-239 mg/dL  High........................> or = 240 mg/dL      Triglycerides  03/22/2023 70  30 - 150 mg/dL Final    Comment: The National Cholesterol Education Program (NCEP) has set the  following guidelines (reference values) for triglycerides:  Normal......................<150 mg/dL  Borderline High.............150-199 mg/dL  High........................200-499 mg/dL      HDL 03/22/2023 79 (H)  40 - 75 mg/dL Final    Comment: The National Cholesterol Education Program (NCEP) has set the  following guidelines (reference values) for HDL Cholesterol:  Low...............<40 mg/dL  Optimal...........>60 mg/dL      LDL Cholesterol 03/22/2023 128.0  63.0 - 159.0 mg/dL Final    Comment: The National Cholesterol Education Program (NCEP) has set the  following guidelines (reference values) for LDL Cholesterol:  Optimal.......................<130 mg/dL  Borderline High...............130-159 mg/dL  High..........................160-189 mg/dL  Very High.....................>190 mg/dL      HDL/Cholesterol Ratio 03/22/2023 35.7  20.0 - 50.0 % Final    Total Cholesterol/HDL Ratio 03/22/2023 2.8  2.0 - 5.0 Final    Non-HDL Cholesterol 03/22/2023 142  mg/dL Final    Comment: Risk category and Non-HDL cholesterol goals:  Coronary heart disease (CHD)or equivalent (10-year risk of CHD >20%):  Non-HDL cholesterol goal     <130 mg/dL  Two or more CHD risk factors and 10-year risk of CHD <= 20%:  Non-HDL cholesterol goal     <160 mg/dL  0 to 1 CHD risk factor:  Non-HDL cholesterol goal     <190 mg/dL      Hepatitis C Ab 03/22/2023 Non-reactive  Non-reactive Final    WBC 03/22/2023 7.58  3.90 - 12.70 K/uL Final    RBC 03/22/2023 4.15 (L)  4.60 - 6.20 M/uL Final    Hemoglobin 03/22/2023 13.6 (L)  14.0 - 18.0 g/dL Final    Hematocrit 03/22/2023 39.2 (L)  40.0 - 54.0 % Final    MCV 03/22/2023 95  82 - 98 fL Final    MCH 03/22/2023 32.8 (H)  27.0 - 31.0 pg Final    MCHC 03/22/2023 34.7  32.0 - 36.0 g/dL Final    RDW 03/22/2023 13.4  11.5 - 14.5 % Final    Platelets 03/22/2023 301  150 - 450 K/uL Final    MPV  03/22/2023 10.0  9.2 - 12.9 fL Final    Immature Granulocytes 03/22/2023 0.5  0.0 - 0.5 % Final    Gran # (ANC) 03/22/2023 3.0  1.8 - 7.7 K/uL Final    Immature Grans (Abs) 03/22/2023 0.04  0.00 - 0.04 K/uL Final    Comment: Mild elevation in immature granulocytes is non specific and   can be seen in a variety of conditions including stress response,   acute inflammation, trauma and pregnancy. Correlation with other   laboratory and clinical findings is essential.      Lymph # 03/22/2023 2.9  1.0 - 4.8 K/uL Final    Mono # 03/22/2023 1.0  0.3 - 1.0 K/uL Final    Eos # 03/22/2023 0.5  0.0 - 0.5 K/uL Final    Baso # 03/22/2023 0.10  0.00 - 0.20 K/uL Final    nRBC 03/22/2023 0  0 /100 WBC Final    Gran % 03/22/2023 40.1  38.0 - 73.0 % Final    Lymph % 03/22/2023 38.7  18.0 - 48.0 % Final    Mono % 03/22/2023 12.7  4.0 - 15.0 % Final    Eosinophil % 03/22/2023 6.7  0.0 - 8.0 % Final    Basophil % 03/22/2023 1.3  0.0 - 1.9 % Final    Differential Method 03/22/2023 Automated   Final     Lab Visit on 03/22/2023   Component Date Value Ref Range Status    Sodium 03/22/2023 133 (L)  136 - 145 mmol/L Final    Potassium 03/22/2023 4.9  3.5 - 5.1 mmol/L Final    Chloride 03/22/2023 99  95 - 110 mmol/L Final    CO2 03/22/2023 25  23 - 29 mmol/L Final    Glucose 03/22/2023 83  70 - 110 mg/dL Final    BUN 03/22/2023 9  8 - 23 mg/dL Final    Creatinine 03/22/2023 0.8  0.5 - 1.4 mg/dL Final    Calcium 03/22/2023 9.6  8.7 - 10.5 mg/dL Final    Total Protein 03/22/2023 7.2  6.0 - 8.4 g/dL Final    Albumin 03/22/2023 4.2  3.5 - 5.2 g/dL Final    Total Bilirubin 03/22/2023 0.6  0.1 - 1.0 mg/dL Final    Comment: For infants and newborns, interpretation of results should be based  on gestational age, weight and in agreement with clinical  observations.    Premature Infant recommended reference ranges:  Up to 24 hours.............<8.0 mg/dL  Up to 48 hours............<12.0 mg/dL  3-5 days..................<15.0 mg/dL  6-29  days.................<15.0 mg/dL      Alkaline Phosphatase 03/22/2023 50 (L)  55 - 135 U/L Final    AST 03/22/2023 39  10 - 40 U/L Final    ALT 03/22/2023 49 (H)  10 - 44 U/L Final    Anion Gap 03/22/2023 9  8 - 16 mmol/L Final    eGFR 03/22/2023 >60  >60 mL/min/1.73 m^2 Final    Cholesterol 03/22/2023 221 (H)  120 - 199 mg/dL Final    Comment: The National Cholesterol Education Program (NCEP) has set the  following guidelines (reference ranges) for Cholesterol:  Optimal.....................<200 mg/dL  Borderline High.............200-239 mg/dL  High........................> or = 240 mg/dL      Triglycerides 03/22/2023 70  30 - 150 mg/dL Final    Comment: The National Cholesterol Education Program (NCEP) has set the  following guidelines (reference values) for triglycerides:  Normal......................<150 mg/dL  Borderline High.............150-199 mg/dL  High........................200-499 mg/dL      HDL 03/22/2023 79 (H)  40 - 75 mg/dL Final    Comment: The National Cholesterol Education Program (NCEP) has set the  following guidelines (reference values) for HDL Cholesterol:  Low...............<40 mg/dL  Optimal...........>60 mg/dL      LDL Cholesterol 03/22/2023 128.0  63.0 - 159.0 mg/dL Final    Comment: The National Cholesterol Education Program (NCEP) has set the  following guidelines (reference values) for LDL Cholesterol:  Optimal.......................<130 mg/dL  Borderline High...............130-159 mg/dL  High..........................160-189 mg/dL  Very High.....................>190 mg/dL      HDL/Cholesterol Ratio 03/22/2023 35.7  20.0 - 50.0 % Final    Total Cholesterol/HDL Ratio 03/22/2023 2.8  2.0 - 5.0 Final    Non-HDL Cholesterol 03/22/2023 142  mg/dL Final    Comment: Risk category and Non-HDL cholesterol goals:  Coronary heart disease (CHD)or equivalent (10-year risk of CHD >20%):  Non-HDL cholesterol goal     <130 mg/dL  Two or more CHD risk factors and 10-year risk of CHD <= 20%:  Non-HDL  cholesterol goal     <160 mg/dL  0 to 1 CHD risk factor:  Non-HDL cholesterol goal     <190 mg/dL      Hepatitis C Ab 03/22/2023 Non-reactive  Non-reactive Final    WBC 03/22/2023 7.58  3.90 - 12.70 K/uL Final    RBC 03/22/2023 4.15 (L)  4.60 - 6.20 M/uL Final    Hemoglobin 03/22/2023 13.6 (L)  14.0 - 18.0 g/dL Final    Hematocrit 03/22/2023 39.2 (L)  40.0 - 54.0 % Final    MCV 03/22/2023 95  82 - 98 fL Final    MCH 03/22/2023 32.8 (H)  27.0 - 31.0 pg Final    MCHC 03/22/2023 34.7  32.0 - 36.0 g/dL Final    RDW 03/22/2023 13.4  11.5 - 14.5 % Final    Platelets 03/22/2023 301  150 - 450 K/uL Final    MPV 03/22/2023 10.0  9.2 - 12.9 fL Final    Immature Granulocytes 03/22/2023 0.5  0.0 - 0.5 % Final    Gran # (ANC) 03/22/2023 3.0  1.8 - 7.7 K/uL Final    Immature Grans (Abs) 03/22/2023 0.04  0.00 - 0.04 K/uL Final    Comment: Mild elevation in immature granulocytes is non specific and   can be seen in a variety of conditions including stress response,   acute inflammation, trauma and pregnancy. Correlation with other   laboratory and clinical findings is essential.      Lymph # 03/22/2023 2.9  1.0 - 4.8 K/uL Final    Mono # 03/22/2023 1.0  0.3 - 1.0 K/uL Final    Eos # 03/22/2023 0.5  0.0 - 0.5 K/uL Final    Baso # 03/22/2023 0.10  0.00 - 0.20 K/uL Final    nRBC 03/22/2023 0  0 /100 WBC Final    Gran % 03/22/2023 40.1  38.0 - 73.0 % Final    Lymph % 03/22/2023 38.7  18.0 - 48.0 % Final    Mono % 03/22/2023 12.7  4.0 - 15.0 % Final    Eosinophil % 03/22/2023 6.7  0.0 - 8.0 % Final    Basophil % 03/22/2023 1.3  0.0 - 1.9 % Final    Differential Method 03/22/2023 Automated   Final         The 10-year ASCVD risk score (Keena MADRID, et al., 2019) is: 18.3%    Values used to calculate the score:      Age: 67 years      Sex: Male      Is Non- : No      Diabetic: No      Tobacco smoker: Yes      Systolic Blood Pressure: 134 mmHg      Is BP treated: No      HDL Cholesterol: 79 mg/dL      Total  Cholesterol: 221 mg/dL    Assessment        ICD-10-CM ICD-9-CM   1. Dyslipidemia  E78.5 272.4   2. Benign essential hypertension  I10 401.1   3. Normocytic anemia  D64.9 285.9   4. Screening for prostate cancer  Z12.5 V76.44         Plan:     1. Dyslipidemia  Assessment & Plan:  The 10-year ASCVD risk score (Keena MADRID, et al., 2019) is: 18.3%    Values used to calculate the score:      Age: 67 years      Sex: Male      Is Non- : No      Diabetic: No      Tobacco smoker: Yes      Systolic Blood Pressure: 134 mmHg      Is BP treated: No      HDL Cholesterol: 79 mg/dL      Total Cholesterol: 221 mg/dL    Elevated risk score for cardiovascular event was explained to patient.    Recommended starting statin medication to lower his risk.    Patient states that he would prefer to not start medication and will attempt to make dietary changes first.  He agrees to recheck levels in four months and recheck blood pressure in four months and follow up in the office.    I sent to the patient on the patient portal dietary recommendations to help.    Orders:  -     Comprehensive Metabolic Panel; Future; Expected date: 08/13/2023  -     Lipid Panel; Future; Expected date: 08/13/2023    2. Benign essential hypertension  Assessment & Plan:  Patient declines starting medication.  States that he would prefer to make dietary changes.  Agrees to recheck blood pressure next four months.  We will re-evaluate in four months and if still elevated we discussed starting blood pressure medication.  Dietary recommendations were sent to him on the patient portal.    Orders:  -     Comprehensive Metabolic Panel; Future; Expected date: 08/13/2023  -     Lipid Panel; Future; Expected date: 08/13/2023    3. Normocytic anemia  Assessment & Plan:  Discussed with patient finding of anemia.  He denies any blood or urine and stools.  He denies any signs of bleeding.  He agrees to have lab testing done to further evaluate  this.    Orders:  -     CBC Auto Differential; Future; Expected date: 04/13/2023  -     Iron and TIBC; Future; Expected date: 04/13/2023  -     Ferritin; Future; Expected date: 04/13/2023  -     Reticulocytes; Future; Expected date: 04/13/2023  -     TSH; Future; Expected date: 04/13/2023    4. Screening for prostate cancer  -     PSA, Screening; Future; Expected date: 04/13/2023           -Dereck Mac Jr., MD, A[facility]      This office note has been dictated.  This dictation has been generated using M-Modal Fluency Direct dictation; some phonetic errors may occur.         Patient Instructions   Check portal on dietary recommendations to make for blood pressure and cholesterol          No follow-ups on file.

## 2023-04-13 NOTE — TELEPHONE ENCOUNTER
----- Message from Dereck Mac Jr., MD sent at 3/26/2023  2:12 AM CDT -----  Please let patient know that we need to discuss his lab results which show anemia, a slightly elevated liver number, and high cholesterol.  If he has a patient portal, we can do a virtual visit, and or can schedule in-person.

## 2023-04-13 NOTE — ASSESSMENT & PLAN NOTE
Patient declines starting medication.  States that he would prefer to make dietary changes.  Agrees to recheck blood pressure next four months.  We will re-evaluate in four months and if still elevated we discussed starting blood pressure medication.  Dietary recommendations were sent to him on the patient portal.

## 2023-06-20 ENCOUNTER — TELEPHONE (OUTPATIENT)
Dept: FAMILY MEDICINE | Facility: CLINIC | Age: 68
End: 2023-06-20
Payer: MEDICARE

## 2023-06-20 NOTE — TELEPHONE ENCOUNTER
----- Message from Dereck Mac Jr., MD sent at 5/20/2023  2:16 AM CDT -----  Patient had been previously advised on scheduling labs and CT scan for early October. Has not been scheduled yet. Please schedule- fasting. Follow with me a week after

## 2023-06-20 NOTE — TELEPHONE ENCOUNTER
Left message on voice mail to return call to clinic to get assist with scheduling CT Scan & Labs that are due in October.

## 2023-06-28 ENCOUNTER — TELEPHONE (OUTPATIENT)
Dept: FAMILY MEDICINE | Facility: CLINIC | Age: 68
End: 2023-06-28
Payer: MEDICARE

## 2023-07-18 ENCOUNTER — TELEPHONE (OUTPATIENT)
Dept: FAMILY MEDICINE | Facility: CLINIC | Age: 68
End: 2023-07-18
Payer: MEDICARE

## 2023-07-18 NOTE — TELEPHONE ENCOUNTER
Called Patient regarding Dr. Mac's message below.  No answer.  Left voice message on an unidentified recorder requesting a call back.

## 2023-09-13 ENCOUNTER — TELEPHONE (OUTPATIENT)
Dept: PULMONOLOGY | Facility: CLINIC | Age: 68
End: 2023-09-13
Payer: MEDICARE

## 2023-12-05 ENCOUNTER — TELEPHONE (OUTPATIENT)
Dept: PULMONOLOGY | Facility: CLINIC | Age: 68
End: 2023-12-05
Payer: MEDICARE

## 2024-02-06 DIAGNOSIS — Z12.11 COLON CANCER SCREENING: ICD-10-CM

## 2024-03-22 ENCOUNTER — TELEPHONE (OUTPATIENT)
Dept: PULMONOLOGY | Facility: CLINIC | Age: 69
End: 2024-03-22
Payer: MEDICARE

## 2024-03-27 DIAGNOSIS — I10 BENIGN ESSENTIAL HYPERTENSION: ICD-10-CM

## 2024-04-11 ENCOUNTER — PATIENT OUTREACH (OUTPATIENT)
Dept: ADMINISTRATIVE | Facility: HOSPITAL | Age: 69
End: 2024-04-11
Payer: MEDICARE

## 2024-05-06 ENCOUNTER — PATIENT OUTREACH (OUTPATIENT)
Dept: ADMINISTRATIVE | Facility: HOSPITAL | Age: 69
End: 2024-05-06
Payer: MEDICARE

## 2024-05-06 ENCOUNTER — PATIENT MESSAGE (OUTPATIENT)
Dept: ADMINISTRATIVE | Facility: HOSPITAL | Age: 69
End: 2024-05-06
Payer: MEDICARE

## 2024-05-24 ENCOUNTER — PATIENT OUTREACH (OUTPATIENT)
Dept: ADMINISTRATIVE | Facility: HOSPITAL | Age: 69
End: 2024-05-24
Payer: MEDICARE

## 2024-06-04 ENCOUNTER — PATIENT MESSAGE (OUTPATIENT)
Dept: ADMINISTRATIVE | Facility: HOSPITAL | Age: 69
End: 2024-06-04
Payer: MEDICARE

## 2024-10-11 ENCOUNTER — TELEPHONE (OUTPATIENT)
Dept: PULMONOLOGY | Facility: CLINIC | Age: 69
End: 2024-10-11
Payer: MEDICARE

## 2024-10-27 ENCOUNTER — TELEPHONE (OUTPATIENT)
Dept: FAMILY MEDICINE | Facility: CLINIC | Age: 69
End: 2024-10-27
Payer: MEDICARE

## 2024-11-13 ENCOUNTER — PATIENT MESSAGE (OUTPATIENT)
Dept: ADMINISTRATIVE | Facility: HOSPITAL | Age: 69
End: 2024-11-13
Payer: MEDICARE

## 2024-12-11 ENCOUNTER — OFFICE VISIT (OUTPATIENT)
Dept: OPTOMETRY | Facility: CLINIC | Age: 69
End: 2024-12-11
Payer: MEDICARE

## 2024-12-11 DIAGNOSIS — H25.13 NUCLEAR SCLEROSIS OF BOTH EYES: Primary | ICD-10-CM

## 2024-12-11 DIAGNOSIS — H52.7 REFRACTIVE ERROR: ICD-10-CM

## 2024-12-11 PROCEDURE — 92015 DETERMINE REFRACTIVE STATE: CPT | Mod: S$GLB,,, | Performed by: OPTOMETRIST

## 2024-12-11 PROCEDURE — 99999 PR PBB SHADOW E&M-EST. PATIENT-LVL I: CPT | Mod: PBBFAC,,, | Performed by: OPTOMETRIST

## 2024-12-11 PROCEDURE — 1126F AMNT PAIN NOTED NONE PRSNT: CPT | Mod: CPTII,S$GLB,, | Performed by: OPTOMETRIST

## 2024-12-11 PROCEDURE — 3288F FALL RISK ASSESSMENT DOCD: CPT | Mod: CPTII,S$GLB,, | Performed by: OPTOMETRIST

## 2024-12-11 PROCEDURE — 92004 COMPRE OPH EXAM NEW PT 1/>: CPT | Mod: S$GLB,,, | Performed by: OPTOMETRIST

## 2024-12-11 PROCEDURE — 1101F PT FALLS ASSESS-DOCD LE1/YR: CPT | Mod: CPTII,S$GLB,, | Performed by: OPTOMETRIST

## 2024-12-11 PROCEDURE — 1159F MED LIST DOCD IN RCRD: CPT | Mod: CPTII,S$GLB,, | Performed by: OPTOMETRIST

## 2024-12-11 PROCEDURE — 1160F RVW MEDS BY RX/DR IN RCRD: CPT | Mod: CPTII,S$GLB,, | Performed by: OPTOMETRIST

## 2024-12-11 NOTE — PROGRESS NOTES
Subjective:       Patient ID: Pradip Padilla is a 69 y.o. male      Chief Complaint   Patient presents with    Concerns About Ocular Health     History of Present Illness  Dls: 4 yrs     70 y/o male presents today with c/o blurry vision at distance and near ou.     Pt wears pal's    No tearing  + ou itching  No burning  No pain  No ha's  No floaters  No flashes    Eye meds  None    Pohx:   None    Fohx:   None       Assessment/Plan:     1. Nuclear sclerosis of both eyes (Primary)  Educated pt on presence of cataracts and effects on vision. No surgery at this time. Recheck in one year, sooner PRN.    2. Refractive error  Educated patient on refractive error and discussed lens options. Dispensed updated spectacle Rx. Educated about adaptation period to new specs.    Eyeglass Final Rx       Eyeglass Final Rx         Sphere Cylinder Axis Add    Right +1.25 +0.50 015 +2.75    Left +0.75 +0.50 160 +2.75      Expiration Date: 12/11/2025                      Follow up in about 1 year (around 12/11/2025).

## 2025-03-17 ENCOUNTER — PATIENT OUTREACH (OUTPATIENT)
Dept: ADMINISTRATIVE | Facility: HOSPITAL | Age: 70
End: 2025-03-17
Payer: MEDICARE

## 2025-05-15 ENCOUNTER — OFFICE VISIT (OUTPATIENT)
Dept: OPTOMETRY | Facility: CLINIC | Age: 70
End: 2025-05-15
Payer: MEDICARE

## 2025-05-15 DIAGNOSIS — Z46.0 FITTING AND ADJUSTMENT OF SPECTACLES AND CONTACT LENSES: Primary | ICD-10-CM

## 2025-05-15 PROCEDURE — 99499 UNLISTED E&M SERVICE: CPT | Mod: S$GLB,,, | Performed by: OPTOMETRIST

## 2025-05-15 NOTE — PROGRESS NOTES
Subjective:       Patient ID: Pradip Padilla is a 69 y.o. male      Chief Complaint   Patient presents with    Concerns About Ocular Health     History of Present Illness  Dls: 12/11/24 Dr. Naqvi    70 y/o male presents today with c/o problems seeing with new [facility]'s for distance ou. Pt had glasses made at [facility].        Assessment/Plan:     1. Fitting and adjustment of spectacles and contact lenses (Primary)  Slight adjustment in MRx. Pt reports good VA in office. Advised due to NSC VA will not be as sharp as previous.    Eyeglass Final Rx       Eyeglass Final Rx         Sphere Cylinder Axis Add    Right +1.00 +0.75 030 +2.75    Left +1.00 +0.75 150 +2.75      Expiration Date: 5/15/2026    Comments: Muriel dye